# Patient Record
Sex: MALE | Race: WHITE | NOT HISPANIC OR LATINO
[De-identification: names, ages, dates, MRNs, and addresses within clinical notes are randomized per-mention and may not be internally consistent; named-entity substitution may affect disease eponyms.]

---

## 2017-01-09 ENCOUNTER — CHART COPY (OUTPATIENT)
Age: 62
End: 2017-01-09

## 2017-01-09 ENCOUNTER — TRANSCRIPTION ENCOUNTER (OUTPATIENT)
Age: 62
End: 2017-01-09

## 2017-01-31 ENCOUNTER — APPOINTMENT (OUTPATIENT)
Dept: ORTHOPEDIC SURGERY | Facility: CLINIC | Age: 62
End: 2017-01-31

## 2017-02-28 ENCOUNTER — APPOINTMENT (OUTPATIENT)
Dept: ORTHOPEDIC SURGERY | Facility: CLINIC | Age: 62
End: 2017-02-28

## 2017-02-28 VITALS
HEIGHT: 67 IN | HEART RATE: 84 BPM | WEIGHT: 270 LBS | BODY MASS INDEX: 42.38 KG/M2 | SYSTOLIC BLOOD PRESSURE: 128 MMHG | DIASTOLIC BLOOD PRESSURE: 75 MMHG

## 2017-03-20 ENCOUNTER — APPOINTMENT (OUTPATIENT)
Dept: ORTHOPEDIC SURGERY | Facility: CLINIC | Age: 62
End: 2017-03-20

## 2017-03-20 VITALS
WEIGHT: 270 LBS | DIASTOLIC BLOOD PRESSURE: 74 MMHG | BODY MASS INDEX: 42.38 KG/M2 | HEART RATE: 81 BPM | HEIGHT: 67 IN | SYSTOLIC BLOOD PRESSURE: 131 MMHG

## 2017-10-24 ENCOUNTER — APPOINTMENT (OUTPATIENT)
Dept: ORTHOPEDIC SURGERY | Facility: CLINIC | Age: 62
End: 2017-10-24
Payer: COMMERCIAL

## 2017-10-24 VITALS
HEIGHT: 67 IN | BODY MASS INDEX: 42.53 KG/M2 | SYSTOLIC BLOOD PRESSURE: 128 MMHG | HEART RATE: 75 BPM | WEIGHT: 271 LBS | DIASTOLIC BLOOD PRESSURE: 75 MMHG

## 2017-10-24 PROCEDURE — 73562 X-RAY EXAM OF KNEE 3: CPT | Mod: LT

## 2017-10-24 PROCEDURE — 99213 OFFICE O/P EST LOW 20 MIN: CPT

## 2017-10-24 RX ORDER — AZITHROMYCIN 250 MG/1
250 TABLET, FILM COATED ORAL
Qty: 6 | Refills: 0 | Status: DISCONTINUED | COMMUNITY
Start: 2017-06-26

## 2018-03-15 ENCOUNTER — CHART COPY (OUTPATIENT)
Age: 63
End: 2018-03-15

## 2019-02-09 ENCOUNTER — APPOINTMENT (OUTPATIENT)
Dept: ORTHOPEDIC SURGERY | Facility: CLINIC | Age: 64
End: 2019-02-09
Payer: COMMERCIAL

## 2019-02-09 VITALS
SYSTOLIC BLOOD PRESSURE: 136 MMHG | BODY MASS INDEX: 43.16 KG/M2 | HEART RATE: 76 BPM | HEIGHT: 67 IN | WEIGHT: 275 LBS | DIASTOLIC BLOOD PRESSURE: 84 MMHG

## 2019-02-09 VITALS — HEART RATE: 75 BPM

## 2019-02-09 DIAGNOSIS — Z86.69 PERSONAL HISTORY OF OTHER DISEASES OF THE NERVOUS SYSTEM AND SENSE ORGANS: ICD-10-CM

## 2019-02-09 DIAGNOSIS — Z86.79 PERSONAL HISTORY OF OTHER DISEASES OF THE CIRCULATORY SYSTEM: ICD-10-CM

## 2019-02-09 PROCEDURE — 73502 X-RAY EXAM HIP UNI 2-3 VIEWS: CPT

## 2019-02-09 PROCEDURE — 99214 OFFICE O/P EST MOD 30 MIN: CPT

## 2019-02-09 PROCEDURE — 73562 X-RAY EXAM OF KNEE 3: CPT | Mod: LT

## 2019-02-09 RX ORDER — LEVOFLOXACIN 500 MG/1
500 TABLET, FILM COATED ORAL
Qty: 10 | Refills: 0 | Status: COMPLETED | COMMUNITY
Start: 2018-12-11

## 2019-02-09 RX ORDER — ALBUTEROL SULFATE 90 UG/1
108 (90 BASE) AEROSOL, METERED RESPIRATORY (INHALATION)
Qty: 18 | Refills: 0 | Status: COMPLETED | COMMUNITY
Start: 2018-12-11

## 2019-02-09 RX ORDER — PROMETHAZINE HYDROCHLORIDE AND DEXTROMETHORPHAN HYDROBROMIDE ORAL SOLUTION 15; 6.25 MG/5ML; MG/5ML
6.25-15 SOLUTION ORAL
Qty: 120 | Refills: 0 | Status: COMPLETED | COMMUNITY
Start: 2018-12-11

## 2019-02-09 RX ORDER — BUDESONIDE AND FORMOTEROL FUMARATE DIHYDRATE 80; 4.5 UG/1; UG/1
80-4.5 AEROSOL RESPIRATORY (INHALATION)
Qty: 10 | Refills: 0 | Status: COMPLETED | COMMUNITY
Start: 2018-12-20

## 2019-02-09 RX ORDER — MELOXICAM 7.5 MG/1
7.5 TABLET ORAL
Qty: 30 | Refills: 1 | Status: ACTIVE | COMMUNITY
Start: 2019-02-09 | End: 1900-01-01

## 2019-02-09 RX ORDER — CELECOXIB 100 MG/1
100 CAPSULE ORAL
Qty: 60 | Refills: 0 | Status: ACTIVE | COMMUNITY
Start: 2019-02-09 | End: 1900-01-01

## 2019-02-09 RX ORDER — SIMVASTATIN 20 MG/1
20 TABLET, FILM COATED ORAL
Refills: 0 | Status: ACTIVE | COMMUNITY

## 2019-02-09 RX ORDER — METHYLPREDNISOLONE 4 MG/1
4 TABLET ORAL
Qty: 21 | Refills: 0 | Status: COMPLETED | COMMUNITY
Start: 2018-12-11

## 2019-02-09 NOTE — DISCUSSION/SUMMARY
[de-identified] : X-rays reviewed of the patient. The patient's left knee replacement is doing well. No signs of loosening or fracture or infection. The patient's hip imitate some early arthritic changes. No significant arthritic changes are appreciated. The patient is to be sent for physical therapy for lower back pain. If this does not improve with PT, he will return to office to see one of the back specialists. He'll continue activities as tolerated. He is prescribed Mobic for pain control.

## 2019-02-09 NOTE — PHYSICAL EXAM
[de-identified] : The patient appears well nourished and in no apparent distress. The patient is alert and oriented to person, place, and time. Affect and mood appear normal. The head is normocephalic and atraumatic. Skin shows normal turgor with no evidence of eczema or psoriasis. No respiratory distress noted. Sensation grossly intact. MUSCULOSKELETAL:   SEE BELOW\par \par Left hip exam demonstrates no significant shortening. There is good internal rotation as well as external rotation. Terminal internal rotation does cause some mild buttock pain. No specific stiffness groin pain. No crepitus. No clicks.\par \par Postop total knee replacement exam\par \par Exam of the left knee reveals that the patient is post knee replacement surgery.  The incision well-healed. No signs of acute trauma. No wound dehisence noted. There is no erythema.   There is small effusion. No calf tenderness. No venous stasis or open skin wounds distally. No foot drop. Sensation is intact to light touch. There is some tenderness of the lower left paravertebral lumbar/sacral spine.\par \par 1- Alignment: measured on AP standing Xray Anatomic Alignment\par Neutral yes\par Varus no\par Valgus No\par \par 2- Medial / Lateral Instability: measured in full extension \par None no\par Little yes\par Moderate no\par Severe no\par \par 3- Anterior / Posterior Instability: measured at 90 degrees \par None yes\par Moderate no\par Severe no\par \par 4 - Range of motion is from zero to to 110 degrees. \par \par 5- Flexion Contracture no\par \par 6- Extensor Lag Minus Points no\par  [de-identified] : X-ray of the left knee was reviewed. Implants are in good position pin sites of loosening or fracture dislocation.\par \par //////////////////////////\par \par X-ray of the left hip and pelvis reviewed. Early osteophyte formation is noted the superior acetabulum. Joint spaces fairly well preserved. No subchondral situation. No sclerotic changes. No fractures.

## 2019-02-09 NOTE — HISTORY OF PRESENT ILLNESS
[de-identified] : Patient presents today for evaluation of a left total knee arthroplasty. He is just over 2 years from his surgery. He reports that the preoperative knee pain has resolved. He does report some mild soft tissue swelling the knee. He does have some mild stiffness with flexion. He reports overall the knee is doing well otherwise. He does also report of pain of the left buttock area. This pain radiates distally towards the knee and to some degree into the groin area. No specific trauma red patient is reported. He does report having a past history of sciatica. His pain is more persistent in the previous sciatica pain. He does state that the pain in the buttock. It is a stabbing/snapping sensation. He denies of any weakness of the left lower extremity. No sensory changes are reported. He does alternate ibuprofen and Tylenol for pain control. He does not currently use a cane or a brace. He denies a past knee injections. He has had PT for lower back in the past with modest success in pain relief.\par \par Review of Systems-\par Constitutional: No fever or chills. \par Cardiovascular: No orthopnea or chest pain\par Pulmonary: No shortness of breath. \par GI: No nausea or vomiting or abdominal pain.\par Musculoskeletal: see HPI \par Psychiatric: No anxiety and depression.

## 2023-04-27 ENCOUNTER — NON-APPOINTMENT (OUTPATIENT)
Age: 68
End: 2023-04-27

## 2023-05-09 ENCOUNTER — NON-APPOINTMENT (OUTPATIENT)
Age: 68
End: 2023-05-09

## 2023-05-09 ENCOUNTER — APPOINTMENT (OUTPATIENT)
Dept: ORTHOPEDIC SURGERY | Facility: CLINIC | Age: 68
End: 2023-05-09
Payer: COMMERCIAL

## 2023-05-09 VITALS
DIASTOLIC BLOOD PRESSURE: 91 MMHG | HEIGHT: 67 IN | HEART RATE: 79 BPM | WEIGHT: 278 LBS | SYSTOLIC BLOOD PRESSURE: 152 MMHG | BODY MASS INDEX: 43.63 KG/M2

## 2023-05-09 PROCEDURE — 73502 X-RAY EXAM HIP UNI 2-3 VIEWS: CPT | Mod: LT

## 2023-05-09 PROCEDURE — 99214 OFFICE O/P EST MOD 30 MIN: CPT

## 2023-05-09 PROCEDURE — 73562 X-RAY EXAM OF KNEE 3: CPT | Mod: LT

## 2023-05-25 ENCOUNTER — RESULT REVIEW (OUTPATIENT)
Age: 68
End: 2023-05-25

## 2023-06-07 ENCOUNTER — APPOINTMENT (OUTPATIENT)
Dept: ULTRASOUND IMAGING | Facility: CLINIC | Age: 68
End: 2023-06-07
Payer: COMMERCIAL

## 2023-06-07 ENCOUNTER — OUTPATIENT (OUTPATIENT)
Dept: OUTPATIENT SERVICES | Facility: HOSPITAL | Age: 68
LOS: 1 days | End: 2023-06-07
Payer: COMMERCIAL

## 2023-06-07 DIAGNOSIS — M25.552 PAIN IN LEFT HIP: ICD-10-CM

## 2023-06-07 DIAGNOSIS — M75.102 UNSPECIFIED ROTATOR CUFF TEAR OR RUPTURE OF LEFT SHOULDER, NOT SPECIFIED AS TRAUMATIC: Chronic | ICD-10-CM

## 2023-06-07 DIAGNOSIS — Z98.89 OTHER SPECIFIED POSTPROCEDURAL STATES: Chronic | ICD-10-CM

## 2023-06-07 DIAGNOSIS — Z90.49 ACQUIRED ABSENCE OF OTHER SPECIFIED PARTS OF DIGESTIVE TRACT: Chronic | ICD-10-CM

## 2023-06-07 DIAGNOSIS — Z90.89 ACQUIRED ABSENCE OF OTHER ORGANS: Chronic | ICD-10-CM

## 2023-06-07 PROCEDURE — 20611 DRAIN/INJ JOINT/BURSA W/US: CPT | Mod: LT

## 2023-06-07 PROCEDURE — 20611 DRAIN/INJ JOINT/BURSA W/US: CPT

## 2023-10-18 ENCOUNTER — APPOINTMENT (OUTPATIENT)
Dept: ORTHOPEDIC SURGERY | Facility: CLINIC | Age: 68
End: 2023-10-18
Payer: MEDICARE

## 2023-10-18 VITALS — BODY MASS INDEX: 43.63 KG/M2 | HEIGHT: 67 IN | WEIGHT: 278 LBS

## 2023-10-18 PROCEDURE — 20611 DRAIN/INJ JOINT/BURSA W/US: CPT | Mod: RT

## 2023-10-18 PROCEDURE — 99203 OFFICE O/P NEW LOW 30 MIN: CPT | Mod: 25

## 2023-10-18 PROCEDURE — 73564 X-RAY EXAM KNEE 4 OR MORE: CPT | Mod: RT

## 2023-10-24 ENCOUNTER — APPOINTMENT (OUTPATIENT)
Dept: ORTHOPEDIC SURGERY | Facility: CLINIC | Age: 68
End: 2023-10-24
Payer: MEDICARE

## 2023-10-24 VITALS — HEIGHT: 67 IN | BODY MASS INDEX: 43.63 KG/M2 | WEIGHT: 278 LBS

## 2023-10-24 DIAGNOSIS — M75.42 IMPINGEMENT SYNDROME OF LEFT SHOULDER: ICD-10-CM

## 2023-10-24 DIAGNOSIS — M19.012 PRIMARY OSTEOARTHRITIS, LEFT SHOULDER: ICD-10-CM

## 2023-10-24 PROCEDURE — 73030 X-RAY EXAM OF SHOULDER: CPT | Mod: LT

## 2023-10-24 PROCEDURE — 20611 DRAIN/INJ JOINT/BURSA W/US: CPT | Mod: LT

## 2023-10-24 PROCEDURE — 99213 OFFICE O/P EST LOW 20 MIN: CPT | Mod: 25

## 2023-10-24 RX ORDER — MELOXICAM 15 MG/1
15 TABLET ORAL
Qty: 30 | Refills: 1 | Status: ACTIVE | COMMUNITY
Start: 2023-10-24 | End: 1900-01-01

## 2023-10-31 ENCOUNTER — APPOINTMENT (OUTPATIENT)
Dept: ORTHOPEDIC SURGERY | Facility: CLINIC | Age: 68
End: 2023-10-31
Payer: MEDICARE

## 2023-10-31 VITALS — WEIGHT: 278 LBS | BODY MASS INDEX: 43.63 KG/M2 | HEIGHT: 67 IN

## 2023-10-31 PROCEDURE — 20611 DRAIN/INJ JOINT/BURSA W/US: CPT | Mod: RT

## 2023-11-07 ENCOUNTER — APPOINTMENT (OUTPATIENT)
Dept: ORTHOPEDIC SURGERY | Facility: CLINIC | Age: 68
End: 2023-11-07

## 2024-03-08 ENCOUNTER — APPOINTMENT (OUTPATIENT)
Dept: ORTHOPEDIC SURGERY | Facility: CLINIC | Age: 69
End: 2024-03-08
Payer: MEDICARE

## 2024-03-08 VITALS — SYSTOLIC BLOOD PRESSURE: 132 MMHG | HEART RATE: 81 BPM | DIASTOLIC BLOOD PRESSURE: 80 MMHG

## 2024-03-08 PROCEDURE — 99213 OFFICE O/P EST LOW 20 MIN: CPT

## 2024-03-08 PROCEDURE — 73562 X-RAY EXAM OF KNEE 3: CPT | Mod: LT

## 2024-03-08 NOTE — DISCUSSION/SUMMARY
[Medication Risks Reviewed] : Medication risks reviewed [2 Weeks] : in 2 weeks [de-identified] : Treatment options reviewed in detail with the patient advised to continue with current treatment plan including physical therapy for which a prescription was provided to the patient as well as continuing Meloxicam and Tylenol as needed for pain.  I discussed X Ray result with patient, and he verbalized understood. He may also continue with ice elevation, rest and PT.  I recommended MRI of left knee since he has no relief with anti-inflammatory, rest, ice/elevation for 5 weeks.  Also, patient reports he experiences some pain on his lower back occasionally. He never see a doctor for that. I advised him to make an appointment with a spine doctor asap.  All patient's questions and concerns were addressed to satisfaction.  If any new or worsening symptoms arise advised patient to call the office. My cumulative time spent on this patient's visit included: Preparation for the visit, review of the medical records, review of pertinent diagnostic studies, examination and counselling of the patient on the above diagnosis, treatment plan and prognosis, orders of diagnostic test, medications and/or appropriate procedures and documentation in the medical records of today's visit. Not including time spent for procedures.

## 2024-03-08 NOTE — PHYSICAL EXAM
[Antalgic] : antalgic [LE] : Sensory: Intact in bilateral lower extremities [DP] : dorsalis pedis 2+ and symmetric bilaterally [Normal] : Alert and in no acute distress [de-identified] : Left knee- swelling 1+ Tenderness to palpation throughout the knee joint.  There is no palpable effusion.  Range of motion is 0-130 with pain or stiffness, there is 1+ crepitus with flexion and extension.  The knee is stable with varus and valgus stress.  There is a negative anterior posterior drawer.  Sensation is intact throughout the distal lower extremity.  Strength is well-maintained in all planes.  There is negative Homans exam.  2+ dorsalis pedis pulse.  [de-identified] : Radiographs of the left knee were performed today. There is no evidence of fracture, dislocation or osteolysis.

## 2024-03-08 NOTE — REVIEW OF SYSTEMS
[Arthralgia] : arthralgia [Joint Pain] : joint pain [Joint Stiffness] : joint stiffness [Joint Swelling] : joint swelling [Negative] : Constitutional [Fever] : no fever [Chills] : no chills [Feeling Tired] : no fatigue

## 2024-03-08 NOTE — HISTORY OF PRESENT ILLNESS
[Pain Location] : pain [] : left knee [Worsening] : worsening [___ wks] : [unfilled] week(s) ago [6] : a current pain level of 6/10 [10] : a maximum pain level of 10/10 [Standing] : standing [Intermit.] : ~He/She~ states the symptoms seem to be intermittent [Bending] : worsened by bending [Direct Pressure] : worsened by direct pressure [Running] : worsened by running [Walking] : worsened by walking [Knee Flexion] : worsened with knee flexion [Knee Extension] : worsened with knee extension [de-identified] : 68-year-old male presents for the evaluation of his left knee pain.  He is s/p Left Total Knee Replacement on 10/24/2016 by DR Cohen. He states that the left knee pain has been going on for few weeks now and worsening since onset.  Patient rate his pain 6/10 at rest and 10/10 when he changes position. He states that the pain started after his vacation to [place]. He had no injury or trauma on that knee. He is having some difficulty from sit to stand, ambulating, ascending/descending stairs. Patient is not using any assistive devices to ambulate. He is taking meloxicam with no improvement in symptoms. He had similar episode of left knee pain in May 2023, and he saw DR Cohen at that time. He had physical therapy and anti-inflammatory which helped for his pain.  He states that he has limited range of motion, some swelling, stiffness, clicking/locking (especially when he sits to stand) on that knee.  Denies any fevers chills chest pain calf pain shortness of breath.   [Rest] : relieved by rest

## 2024-03-11 ENCOUNTER — APPOINTMENT (OUTPATIENT)
Dept: MRI IMAGING | Facility: CLINIC | Age: 69
End: 2024-03-11
Payer: MEDICARE

## 2024-03-11 ENCOUNTER — OUTPATIENT (OUTPATIENT)
Dept: OUTPATIENT SERVICES | Facility: HOSPITAL | Age: 69
LOS: 1 days | End: 2024-03-11
Payer: MEDICARE

## 2024-03-11 DIAGNOSIS — Z90.49 ACQUIRED ABSENCE OF OTHER SPECIFIED PARTS OF DIGESTIVE TRACT: Chronic | ICD-10-CM

## 2024-03-11 DIAGNOSIS — Z90.89 ACQUIRED ABSENCE OF OTHER ORGANS: Chronic | ICD-10-CM

## 2024-03-11 DIAGNOSIS — Z98.89 OTHER SPECIFIED POSTPROCEDURAL STATES: Chronic | ICD-10-CM

## 2024-03-11 DIAGNOSIS — Z96.652 PRESENCE OF LEFT ARTIFICIAL KNEE JOINT: ICD-10-CM

## 2024-03-11 DIAGNOSIS — M75.102 UNSPECIFIED ROTATOR CUFF TEAR OR RUPTURE OF LEFT SHOULDER, NOT SPECIFIED AS TRAUMATIC: Chronic | ICD-10-CM

## 2024-03-11 PROCEDURE — 73721 MRI JNT OF LWR EXTRE W/O DYE: CPT

## 2024-03-11 PROCEDURE — 73721 MRI JNT OF LWR EXTRE W/O DYE: CPT | Mod: 26,LT,MH

## 2024-03-22 ENCOUNTER — APPOINTMENT (OUTPATIENT)
Dept: ORTHOPEDIC SURGERY | Facility: CLINIC | Age: 69
End: 2024-03-22
Payer: MEDICARE

## 2024-03-22 VITALS
DIASTOLIC BLOOD PRESSURE: 87 MMHG | BODY MASS INDEX: 43.95 KG/M2 | SYSTOLIC BLOOD PRESSURE: 146 MMHG | HEART RATE: 84 BPM | WEIGHT: 280 LBS | HEIGHT: 67 IN

## 2024-03-22 PROCEDURE — 99213 OFFICE O/P EST LOW 20 MIN: CPT

## 2024-03-22 NOTE — PHYSICAL EXAM
[de-identified] : Examination of the left knee reveals slight tenderness over the patella tendon.  There is no extension lag.  Quadricep strength is 5/5.  The patella is nontender.  There is no clunk or click.  Patella plate is normal.  Range of motion is full extension and flexion to 110 degrees. [de-identified] : MRI of the left knee show status post total knee replacement no acute fracture no convincing evidence of osteolysis.  Nonspecific small knee joint effusion with synovitis.  Small amount of gastro anemias bursal fluid

## 2024-03-22 NOTE — DISCUSSION/SUMMARY
[de-identified] : With regard to the patient left TKR, I explained to the patient that in my opinion is knee examination as well as images shows stable TKR.  I advised on continued physical therapy.  I also discussed with the patient weight reduction which obviously affect his gait and can cause joint pain.  Patient fully understand my explanation and will follow-up with me as needed.

## 2024-03-22 NOTE — HISTORY OF PRESENT ILLNESS
[de-identified] : Patient seen today for follow-up and review recent MRI of his left knee.  Patient is status post a left TKR in 2016.  Recently, the patient complained on pain that radiated from the buttock area down to his leg towards the knee.  She feels difficulties getting from a sitting position.  That creates anterior knee pain.  He has less pain while walking.  Patient started physical therapy few days ago.

## 2024-03-22 NOTE — REASON FOR VISIT
[Follow-Up Visit] : a follow-up visit for [Artificial Knee Joint] : an artificial knee joint [Knee Pain] : knee pain [FreeTextEntry2] : Left total knee replacement 10/24/2016

## 2024-05-08 ENCOUNTER — APPOINTMENT (OUTPATIENT)
Dept: ORTHOPEDIC SURGERY | Facility: CLINIC | Age: 69
End: 2024-05-08
Payer: MEDICARE

## 2024-05-08 PROCEDURE — 99213 OFFICE O/P EST LOW 20 MIN: CPT | Mod: 25

## 2024-05-08 PROCEDURE — 20611 DRAIN/INJ JOINT/BURSA W/US: CPT | Mod: RT

## 2024-05-08 RX ORDER — CELECOXIB 200 MG/1
200 CAPSULE ORAL
Qty: 30 | Refills: 0 | Status: ACTIVE | COMMUNITY
Start: 2024-05-08 | End: 1900-01-01

## 2024-05-08 NOTE — HISTORY OF PRESENT ILLNESS
[9] : 9 [7] : 7 [Dull/Aching] : dull/aching [Radiating] : radiating [Throbbing] : throbbing [Tingling] : tingling [Constant] : constant [Sleep] : sleep [Rest] : rest [Meds] : meds [Sitting] : sitting [Lying in bed] : lying in bed [Retired] : Work status: retired [3] : 3 [Euflexxa] : Euflexxa [de-identified] : RT knee pain. Denies trauma. Completed euflexxa #3 R knee Oct 31 with help until now, some swelling, uses Mobic and tylenol with min help [] : no [FreeTextEntry1] : left shoulder pain  [FreeTextEntry5] : No trauma, patient has had several surgeries to left shoulder in past, over last week the pain has flared up. [FreeTextEntry6] : numbness and tingling  [FreeTextEntry7] : down arm  [FreeTextEntry9] : advil [de-identified] : 2010 [de-identified] : 10/24/23 [de-identified] : right knee

## 2024-05-08 NOTE — ASSESSMENT
[FreeTextEntry1] : Patient allowed to gently start resuming activities. Discussed change to medication prescription and usage. Offered cortisone steroid injection.for pain control Bracing options discussed with patient. Hyaluronic Acid inj pamphlet given to pt. try topical lidocaine for pain reviewed current medications being used by this patient Home exercise for functional improvement 05/08/2024  RE:  [facility]   Acct #- [id number]  Attention:  Nurse Reviewer /Medical Director  I am writing this letter as a medical necessity for HA euflexxa R knee Patient has tried analgesics, non-steroid anti-inflammatory agents,  physical therapy, hot or cold compresses,injections of corticosteroids, etc)  which in combination or by themselves has not worked. Based on my patient's condition, I strongly believe that the Hyaluronic aid injections is medically needed.   Thank you for your time and consideration.

## 2024-05-08 NOTE — PHYSICAL EXAM
[Right] : right knee [5___] : quadriceps 5[unfilled]/5 [Negative] : negative Harry's [] : negative Lachmann [TWNoteComboBox7] : flexion 115 degrees

## 2024-05-08 NOTE — PROCEDURE
[Large Joint Injection] : Large joint injection [Right] : of the right [Knee] : knee [Pain] : pain [Inflammation] : inflammation [Betadine] : betadine [___ cc    1%] : Lidocaine ~Vcc of 1%  [Effusion] : effusion [] : Patient tolerated procedure well [Call if redness, pain or fever occur] : call if redness, pain or fever occur [Apply ice for 15min out of every hour for the next 12-24 hours as tolerated] : apply ice for 15 minutes out of every hour for the next 12-24 hours as tolerated [Patient was advised to rest the joint(s) for ____ days] : patient was advised to rest the joint(s) for [unfilled] days [de-identified] : 5 [de-identified] : clear [FreeTextEntry3] : Euflexxa (Large Joint) with Ultrasound Guidance Viscosupplementation Injection: X-ray evidence of Osteoarthritis on this or prior visit and Patient has tried OTC's including aspirin, Ibuprofen, Aleve etc or prescription NSAIDS, and/or exercises at home and/ or physical therapy without satisfactory response.  An injection of Euflexxa 2ml #__1_ was injected into the right knee(s). after verbal consent using sterile technique. The risks, benefits, and alternatives to Viscosupplementation injection were explained in full to the patient. Risks outlined include but are not limited to infection, sepsis, bleeding, scarring, skin discoloration, temporary increase in pain, syncopal episode, failure to resolve symptoms, allergic reaction, and symptom recurrence. Signs and symptoms of infection reviewed and patient advised to call immediately for redness, fevers, and/or chills. Patient understood the risks. All questions were answered. After discussion of options, patient requested Viscosupplementation. Oral informed consent was obtained and sterile prep was done of the injection site. Sterile technique was used without complications. The patient tolerated the procedure well. Ice tonight to the injection site.   Ultrasound Guidance was used for the following reasons: altered anatomic landmarks because of erosive arthritis.   Ultrasound guided injection was performed of the knee, visualization of the needle and placement of injection was performed without complication.

## 2024-05-14 ENCOUNTER — APPOINTMENT (OUTPATIENT)
Dept: ORTHOPEDIC SURGERY | Facility: CLINIC | Age: 69
End: 2024-05-14
Payer: MEDICARE

## 2024-05-14 ENCOUNTER — NON-APPOINTMENT (OUTPATIENT)
Age: 69
End: 2024-05-14

## 2024-05-14 VITALS — HEIGHT: 67 IN | WEIGHT: 287 LBS | BODY MASS INDEX: 45.04 KG/M2

## 2024-05-14 DIAGNOSIS — M54.50 LOW BACK PAIN, UNSPECIFIED: ICD-10-CM

## 2024-05-14 DIAGNOSIS — Z96.652 PRESENCE OF LEFT ARTIFICIAL KNEE JOINT: ICD-10-CM

## 2024-05-14 DIAGNOSIS — M25.461 EFFUSION, RIGHT KNEE: ICD-10-CM

## 2024-05-14 DIAGNOSIS — G89.29 PAIN IN LEFT KNEE: ICD-10-CM

## 2024-05-14 DIAGNOSIS — M25.562 PAIN IN LEFT KNEE: ICD-10-CM

## 2024-05-14 DIAGNOSIS — M17.12 UNILATERAL PRIMARY OSTEOARTHRITIS, LEFT KNEE: ICD-10-CM

## 2024-05-14 DIAGNOSIS — M16.12 UNILATERAL PRIMARY OSTEOARTHRITIS, LEFT HIP: ICD-10-CM

## 2024-05-14 DIAGNOSIS — M25.552 PAIN IN LEFT HIP: ICD-10-CM

## 2024-05-14 PROCEDURE — 99214 OFFICE O/P EST MOD 30 MIN: CPT

## 2024-05-14 NOTE — DISCUSSION/SUMMARY
[de-identified] : This patient has extra-articular left knee pain.  I do not believe that the pain is from the left total knee arthroplasty this appears to be functioning well both on physical examination and radiographically.  This likely is related to his lumbar radiculopathy and he has known history of lumbar radiculopathy and spinal stenosis.  The patient is not an appropriate candidate for surgical intervention at this time. An extensive discussion was conducted on the natural history of the disease and the variety of surgical and non-surgical options available to the patient including, but not limited to non-steroidal anti-inflammatory medications, steroid injections, physical therapy, maintenance of ideal body weight, and reduction of activity.  Referred to physiatry.  I offered a prescription for meloxicam but he already has this medication at home and will continue to take this medication.  Weight loss recommended. The patient will schedule an appointment as needed.

## 2024-05-14 NOTE — PHYSICAL EXAM
[de-identified] : Patient is well nourished, well-developed, in no acute distress, with appropriate mood and affect. The patient is oriented to time, place, and person. Respirations are even and unlabored. Gait evaluation does not reveal a limp. There is no inguinal adenopathy. Examination of the contralateral knee shows normal range of motion, strength, no tenderness, and intact skin. The operative limb is well-perfused, has a well appearing surgical scar, and shows a grossly normal motor and sensory examination. Knee motion is painless and the left knee moves from 0 to 125 degrees. The knee is stable within that range-of-motion to AP and ML stress. The alignment of the knee is neutral. Muscle strength is normal. Quadriceps and hamstring muscle strength is normal bilaterally. Pedal pulses are palpable.  [de-identified] : AP, lateral, tunnel, and sunrise knee x-rays of the left knee were brought in by the patient which I reviewed and demonstrate satisfactory position and alignment of the components are present. No signs of loosening are seen.  AP pelvis, AP and lateral hip radiographs of the left hip were brought in by the patient which are reviewed and demonstrate no evidence of degenerative joint disease of the hip with maintained joint space and no evidence of fractures or other intraarticular pathology.  The patient brings with him an MRI of the left knee.  The MR imaging with the patient was demonstrates a well-functioning left total knee arthroplasty without evidence of failure.

## 2024-05-14 NOTE — REASON FOR VISIT
[Initial Visit] : an initial visit for [Artificial Knee Joint] : an artificial knee joint [Knee Pain] : knee pain [Radiculopathy] : radiculopathy

## 2024-05-14 NOTE — HISTORY OF PRESENT ILLNESS
[de-identified] : This is very nice 68-year-old gentleman experiencing anterior left knee and left leg weakness and pain, which is moderate in intensity. The pain substantially limits activities of daily living. Walking tolerance is reduced.  History of a left total knee arthroplasty performed in 2016 by Dr. Zamudio at [facility].  Recovered well from the surgery.  BMI is 50.  Pain started 7 months ago.  He tried physical therapy which did not help.  Saw Dr. Umana who told him that the total knee arthroplasty is working well.  He has paresthesias from the left buttock down to the foot.  Some weakness in the knee.  No bowel or bladder incontinence.  Not using cane or walker.  No fevers or chills.

## 2024-05-15 ENCOUNTER — APPOINTMENT (OUTPATIENT)
Dept: ORTHOPEDIC SURGERY | Facility: CLINIC | Age: 69
End: 2024-05-15
Payer: MEDICARE

## 2024-05-15 PROCEDURE — 20611 DRAIN/INJ JOINT/BURSA W/US: CPT | Mod: RT

## 2024-05-15 NOTE — PROCEDURE
[Large Joint Injection] : Large joint injection [Right] : of the right [Knee] : knee [Pain] : pain [Inflammation] : inflammation [Betadine] : betadine [___ cc    1%] : Lidocaine ~Vcc of 1%  [Effusion] : effusion [] : Patient tolerated procedure well [Call if redness, pain or fever occur] : call if redness, pain or fever occur [Apply ice for 15min out of every hour for the next 12-24 hours as tolerated] : apply ice for 15 minutes out of every hour for the next 12-24 hours as tolerated [Patient was advised to rest the joint(s) for ____ days] : patient was advised to rest the joint(s) for [unfilled] days [de-identified] : 5 [de-identified] : clear [FreeTextEntry3] : Euflexxa (Large Joint) with Ultrasound Guidance Viscosupplementation Injection: X-ray evidence of Osteoarthritis on this or prior visit and Patient has tried OTC's including aspirin, Ibuprofen, Aleve etc or prescription NSAIDS, and/or exercises at home and/ or physical therapy without satisfactory response.  An injection of Euflexxa 2ml #_2_ was injected into the right knee(s). after verbal consent using sterile technique. The risks, benefits, and alternatives to Viscosupplementation injection were explained in full to the patient. Risks outlined include but are not limited to infection, sepsis, bleeding, scarring, skin discoloration, temporary increase in pain, syncopal episode, failure to resolve symptoms, allergic reaction, and symptom recurrence. Signs and symptoms of infection reviewed and patient advised to call immediately for redness, fevers, and/or chills. Patient understood the risks. All questions were answered. After discussion of options, patient requested Viscosupplementation. Oral informed consent was obtained and sterile prep was done of the injection site. Sterile technique was used without complications. The patient tolerated the procedure well. Ice tonight to the injection site.   Ultrasound Guidance was used for the following reasons: altered anatomic landmarks because of erosive arthritis.   Ultrasound guided injection was performed of the knee, visualization of the needle and placement of injection was performed without complication.

## 2024-05-21 ENCOUNTER — APPOINTMENT (OUTPATIENT)
Dept: ORTHOPEDIC SURGERY | Facility: CLINIC | Age: 69
End: 2024-05-21

## 2024-05-22 ENCOUNTER — APPOINTMENT (OUTPATIENT)
Dept: ORTHOPEDIC SURGERY | Facility: CLINIC | Age: 69
End: 2024-05-22
Payer: MEDICARE

## 2024-05-22 DIAGNOSIS — M17.11 UNILATERAL PRIMARY OSTEOARTHRITIS, RIGHT KNEE: ICD-10-CM

## 2024-05-22 PROCEDURE — 20611 DRAIN/INJ JOINT/BURSA W/US: CPT | Mod: RT

## 2024-05-22 NOTE — DISCUSSION/SUMMARY
[de-identified] : rest, ice, activity modification.   Frequency of visco and csi reviewed. Return in 6 weeks as needed.

## 2024-05-22 NOTE — PROCEDURE
[FreeTextEntry3] : Euflexxa (Large Joint) with Ultrasound Guidance Viscosupplementation Injection: X-ray evidence of Osteoarthritis on this or prior visit and Patient has tried OTC's including aspirin, Ibuprofen, Aleve etc or prescription NSAIDS, and/or exercises at home and/ or physical therapy without satisfactory response.  An injection of Euflexxa 2ml #_3_ was injected into the right knee(s). after verbal consent using sterile technique. The risks, benefits, and alternatives to Viscosupplementation injection were explained in full to the patient. Risks outlined include but are not limited to infection, sepsis, bleeding, scarring, skin discoloration, temporary increase in pain, syncopal episode, failure to resolve symptoms, allergic reaction, and symptom recurrence. Signs and symptoms of infection reviewed and patient advised to call immediately for redness, fevers, and/or chills. Patient understood the risks. All questions were answered. After discussion of options, patient requested Viscosupplementation. Oral informed consent was obtained and sterile prep was done of the injection site. Sterile technique was used without complications. The patient tolerated the procedure well. Ice tonight to the injection site.   Ultrasound Guidance was used for the following reasons: altered anatomic landmarks because of erosive arthritis.   Ultrasound guided injection was performed of the knee, visualization of the needle and placement of injection was performed without complication.